# Patient Record
Sex: MALE | Race: WHITE | HISPANIC OR LATINO | Employment: UNEMPLOYED | URBAN - METROPOLITAN AREA
[De-identification: names, ages, dates, MRNs, and addresses within clinical notes are randomized per-mention and may not be internally consistent; named-entity substitution may affect disease eponyms.]

---
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
## 2025-03-20 ENCOUNTER — OFFICE VISIT (OUTPATIENT)
Dept: OBGYN CLINIC | Facility: MEDICAL CENTER | Age: 20
End: 2025-03-20
Payer: COMMERCIAL

## 2025-03-20 ENCOUNTER — APPOINTMENT (OUTPATIENT)
Dept: RADIOLOGY | Facility: MEDICAL CENTER | Age: 20
End: 2025-03-20
Payer: COMMERCIAL

## 2025-03-20 VITALS — WEIGHT: 151 LBS | HEIGHT: 68 IN | BODY MASS INDEX: 22.88 KG/M2

## 2025-03-20 DIAGNOSIS — M79.671 BILATERAL FOOT PAIN: Primary | ICD-10-CM

## 2025-03-20 DIAGNOSIS — M79.672 BILATERAL FOOT PAIN: Primary | ICD-10-CM

## 2025-03-20 DIAGNOSIS — M79.672 BILATERAL FOOT PAIN: ICD-10-CM

## 2025-03-20 DIAGNOSIS — M79.671 BILATERAL FOOT PAIN: ICD-10-CM

## 2025-03-20 PROCEDURE — 73630 X-RAY EXAM OF FOOT: CPT

## 2025-03-20 PROCEDURE — 99203 OFFICE O/P NEW LOW 30 MIN: CPT | Performed by: EMERGENCY MEDICINE

## 2025-03-20 NOTE — LETTER
March 20, 2025     Patient: Xiang Ram  YOB: 2005  Date of Visit: 3/20/2025      To Whom it May Concern:    Xiang Ram is under my professional care. Xiang was seen in my office on 3/20/2025.  Work excuse today, tomorrow and Saturday.  May return to work full duty Monday 3/24/25.      If you have any questions or concerns, please don't hesitate to call.         Sincerely,          Donny Peacock MD        CC: No Recipients

## 2025-04-28 ENCOUNTER — OFFICE VISIT (OUTPATIENT)
Dept: OBGYN CLINIC | Facility: OTHER | Age: 20
End: 2025-04-28
Payer: COMMERCIAL

## 2025-04-28 ENCOUNTER — APPOINTMENT (OUTPATIENT)
Dept: RADIOLOGY | Facility: OTHER | Age: 20
End: 2025-04-28
Attending: ORTHOPAEDIC SURGERY
Payer: COMMERCIAL

## 2025-04-28 VITALS — HEIGHT: 68 IN | WEIGHT: 155 LBS | BODY MASS INDEX: 23.49 KG/M2

## 2025-04-28 DIAGNOSIS — M25.512 ACUTE PAIN OF LEFT SHOULDER: ICD-10-CM

## 2025-04-28 DIAGNOSIS — S43.52XA ACROMIOCLAVICULAR SPRAIN, LEFT, INITIAL ENCOUNTER: Primary | ICD-10-CM

## 2025-04-28 PROCEDURE — 73030 X-RAY EXAM OF SHOULDER: CPT

## 2025-04-28 PROCEDURE — 99214 OFFICE O/P EST MOD 30 MIN: CPT | Performed by: ORTHOPAEDIC SURGERY

## 2025-04-28 RX ORDER — NAPROXEN 500 MG/1
500 TABLET ORAL 2 TIMES DAILY WITH MEALS
Qty: 20 TABLET | Refills: 0 | Status: SHIPPED | OUTPATIENT
Start: 2025-04-28

## 2025-04-28 NOTE — ASSESSMENT & PLAN NOTE
I explained my current clinical findings and reviewed the radiological findings with the patient.  Symptoms consistent with grade 1 left acromioclavicular joint sprain.  Prescribed oral naproxen for symptomatic relief and he may also do local ice application.  Encouraged to initiate left shoulder active range of motion exercises as tolerated.  Work activity modification note provided.  Follow-up in 3 weeks.  Patient was explained that symptomatic improvement can take 4 to 6 weeks.  Orders:    naproxen (NAPROSYN) 500 mg tablet; Take 1 tablet (500 mg total) by mouth 2 (two) times a day with meals

## 2025-04-28 NOTE — LETTER
April 28, 2025     Patient: Xiang Ram  YOB: 2005  Date of Visit: 4/28/2025      To Whom it May Concern:    Xiang Ram is under my professional care. Xiang was seen in my office on 4/28/2025.  Please excuse work duty from 4/28/2025 till 5/4/2025 due to an injury.  Thereafter, may return back to work with avoidance of repetitive left shoulder motion and no heavy lifting, pulling or pushing from the left upper extremity over 5 pounds until his next office follow-up in 3 weeks.    If you have any questions or concerns, please don't hesitate to call.          Sincerely,          Treva Smith MD        CC: No Recipients

## 2025-04-28 NOTE — PROGRESS NOTES
Name: Xiang Ram      : 2005      MRN: [id number]  Encounter Provider: Treva Smith MD  Encounter Date: 2025   Encounter department: [facility]  :  Assessment & Plan  Acromioclavicular sprain, left, initial encounter  I explained my current clinical findings and reviewed the radiological findings with the patient.  Symptoms consistent with grade 1 left acromioclavicular joint sprain.  Prescribed oral naproxen for symptomatic relief and he may also do local ice application.  Encouraged to initiate left shoulder active range of motion exercises as tolerated.  Work activity modification note provided.  Follow-up in 3 weeks.  Patient was explained that symptomatic improvement can take 4 to 6 weeks.  Orders:    naproxen (NAPROSYN) 500 mg tablet; Take 1 tablet (500 mg total) by mouth 2 (two) times a day with meals    Acute pain of left shoulder    Orders:    XR shoulder 2+ vw left; Future    naproxen (NAPROSYN) 500 mg tablet; Take 1 tablet (500 mg total) by mouth 2 (two) times a day with meals        History of Present Illness   HPI  Xiang Ram is a 20 y.o. male who presents for evaluation of left shoulder pain.  Reports accidentally falling onto his left shoulder 2 days ago after which she has developed pain in the superior aspect of the left shoulder with some radiation to the upper left posterior shoulder.  Symptoms aggravated with movement of the left shoulder.  Does not have any known history of previous left shoulder problems or injuries.  History obtained from: patient    Review of Systems       Objective   There were no vitals taken for this visit.     Physical Exam  Nursing note reviewed.              Left Shoulder Exam     Tenderness   The patient is experiencing tenderness in the acromioclavicular joint.    Range of Motion   Active abduction:  70   External rotation:  80   Forward flexion:  80   Internal rotation 0 degrees:  L1     Muscle Strength  "  The patient has normal left shoulder strength.    Tests   Apprehension: negative  Naranjo test: negative  Cross arm: positive  Impingement: negative  Drop arm: negative     Comments:  Overlying skin of the left shoulder appears normal with a minimal step-off noted compared to the contralateral side at the level of acromioclavicular joint.           I have personally reviewed pertinent films in PACS and my interpretation is plain radiograph of the left shoulder performed today does not reveal any acute fractures.  Minimal step-off of the acromioclavicular joint indicative of sprain..   Procedures  Portions of the record may have been created with voice recognition software. Occasional wrong word or \"sound alike\" substitutions may have occurred due to the inherent limitations of voice recognition software. Please review the chart carefully and recognize, using context, where substitutions/typographical errors may have occurred.      "

## 2025-05-19 ENCOUNTER — OFFICE VISIT (OUTPATIENT)
Dept: OBGYN CLINIC | Facility: OTHER | Age: 20
End: 2025-05-19
Payer: COMMERCIAL

## 2025-05-19 VITALS — WEIGHT: 150 LBS | BODY MASS INDEX: 22.73 KG/M2 | HEIGHT: 68 IN

## 2025-05-19 DIAGNOSIS — S43.52XA ACROMIOCLAVICULAR SPRAIN, LEFT, INITIAL ENCOUNTER: Primary | ICD-10-CM

## 2025-05-19 PROCEDURE — 99213 OFFICE O/P EST LOW 20 MIN: CPT | Performed by: ORTHOPAEDIC SURGERY

## 2025-05-19 NOTE — ASSESSMENT & PLAN NOTE
The patient has an examination consistent with well healed left AC joint sprain.  I have discussed with the patient the pathophysiology of this diagnosis and reviewed how the examination correlates with this diagnosis.  At this time, patient may be cleared to return to work without restrictions. He may follow up on an as needed basis.

## 2025-05-19 NOTE — LETTER
May 19, 2025     Patient: Xiang Ram  YOB: 2005  Date of Visit: 5/19/2025      To Whom it May Concern:    Xiang Ram is under my professional care. Xiang was seen in my office on 5/19/2025. He may return to work without restrictions starting 5/19/25.     If you have any questions or concerns, please don't hesitate to call.          Sincerely,          Pavel Allred MD        CC: No Recipients

## 2025-05-19 NOTE — PROGRESS NOTES
"I personally examined the patient and reviewed the history provided.  I agree with the note and the assessment and plan by Dr. Chadwick Welsh MD.     Assessment & Plan  Acromioclavicular sprain, left, initial encounter       The patient has an examination consistent with well healed left AC joint sprain.  I have discussed with the patient the pathophysiology of this diagnosis and reviewed how the examination correlates with this diagnosis.  At this time, patient may be cleared to return to work without restrictions. He may follow up on an as needed basis.    Subjective:   Patient ID: Xiang Ram is a 20 y.o. male      HPI  The patient presents for follow up of left AC joint sprain after fall onto left shoulder. He was seen by Dr. Smith and prescribed oral naproxen for relief. He was encouraged to initiate left shoulder active range of motion exercises as tolerated.  He is provided a work activity modification note and now follows up 3 weeks later.  Over the past 3 weeks, patient has been compliant with prescribed modifications and range of motion exercises.  He reports that his pain is now subsided and he has full range of motion of his shoulder.  He presents here today for reevaluation and clearance for return to work without restrictions      The following portions of the patient's history were reviewed and updated as appropriate: allergies, current medications, past family history, past medical history, past social history, past surgical history and problem list.      Objective:  Ht 5' 8\" (1.727 m) Comment: verbal  Wt 68 kg (150 lb)   BMI 22.81 kg/m²       Left Shoulder Exam   Left shoulder exam is normal.    Range of Motion   The patient has normal left shoulder ROM.    Muscle Strength   The patient has normal left shoulder strength.  Abduction: 5/5   Internal rotation: 5/5   External rotation: 5/5   Supraspinatus: 5/5   Subscapularis: 5/5   Biceps: 5/5     Other   Sensation: normal  Pulse: present "           I have personally reviewed pertinent films in PACS and my interpretation is as follows.    Xrays left shoulder 4/28/25 show no fracture, well appearing GH joint     Records Reviewed: office notes and x-ray reports

## 2025-06-23 ENCOUNTER — OFFICE VISIT (OUTPATIENT)
Dept: OBGYN CLINIC | Facility: OTHER | Age: 20
End: 2025-06-23
Payer: COMMERCIAL

## 2025-06-23 VITALS — HEIGHT: 68 IN | WEIGHT: 151 LBS | BODY MASS INDEX: 22.88 KG/M2

## 2025-06-23 DIAGNOSIS — S43.52XD ACROMIOCLAVICULAR SPRAIN, LEFT, SUBSEQUENT ENCOUNTER: Primary | ICD-10-CM

## 2025-06-23 PROCEDURE — 99213 OFFICE O/P EST LOW 20 MIN: CPT | Performed by: ORTHOPAEDIC SURGERY

## 2025-06-23 NOTE — ASSESSMENT & PLAN NOTE
"Discussed with the patient and his family member that he continues to have symptoms consistent with an AC joint sprain (Grade 1).  He does note that his symptoms resolved but then have recurred within a week and therefore more treatment is indicated.  Typically this is self limiting and will improve with time but given his persistence of symptoms physical therapy will be initiated to provide local care and if he fails to improve further imaging the form of an MRI could be considered.  I do not think an MRI arthrogram is required for the diagnosis given his symptoms localized to the AC joint and his lack of any other internal derangement signs or symptoms to suggest a labral tear, diagnosis of a labral tear would would require MRI arthrogram but given his lack of these findings and a low clinical suspicion of a labral tear I would avoid the intervention of the arthrogram..    Surgical treatment is reserved for patients who cannot improve with time activity modification and that treatment would be a distal clavicle excision, I would hesitate to perform this in a 20-year-old without significant amount of nonoperative care first but this is always a possibility in the future.  The patient does complain that there is a \"bump \"over his AC joint, this is certainly observable and is quite subtle, I do not feel surgical intervention to improve the cosmetic appearance of the AC joint is indicated in this scenario  Follow up in 6 weeks       Orders:    Ambulatory Referral to Physical Therapy; Future    "

## 2025-06-23 NOTE — PROGRESS NOTES
"  Assessment & Plan  Acromioclavicular sprain, left, subsequent encounter  Discussed with the patient and his family member that he continues to have symptoms consistent with an AC joint sprain (Grade 1).  He does note that his symptoms resolved but then have recurred within a week and therefore more treatment is indicated.  Typically this is self limiting and will improve with time but given his persistence of symptoms physical therapy will be initiated to provide local care and if he fails to improve further imaging the form of an MRI could be considered.  I do not think an MRI arthrogram is required for the diagnosis given his symptoms localized to the AC joint and his lack of any other internal derangement signs or symptoms to suggest a labral tear, diagnosis of a labral tear would would require MRI arthrogram but given his lack of these findings and a low clinical suspicion of a labral tear I would avoid the intervention of the arthrogram..    Surgical treatment is reserved for patients who cannot improve with time activity modification and that treatment would be a distal clavicle excision, I would hesitate to perform this in a 20-year-old without significant amount of nonoperative care first but this is always a possibility in the future.  The patient does complain that there is a \"bump \"over his AC joint, this is certainly observable and is quite subtle, I do not feel surgical intervention to improve the cosmetic appearance of the AC joint is indicated in this scenario  Follow up in 6 weeks       Orders:    Ambulatory Referral to Physical Therapy; Future      Subjective:   Patient ID: Xiang Ram is a 20 y.o. male      HPI  Patient presents today for follow up regarding left shoulder AC sprain.  DOI 4/26/25. He was last seen 5/19/25 at which time he was doing well.  He was cleared to return to work at that visit without restrictions.  Today he reports he has a noticeable lump at the AC joint.  He reports " "increased pain after lifting heavier weight.        The following portions of the patient's history were reviewed and updated as appropriate: allergies, current medications, past family history, past medical history, past social history, past surgical history and problem list.        Objective:  Ht 5' 8\" (1.727 m) Comment: verbal  Wt 68.5 kg (151 lb)   BMI 22.96 kg/m²       Left Shoulder Exam     Tenderness   The patient is experiencing tenderness in the acromioclavicular joint.    Range of Motion   The patient has normal left shoulder ROM.    Muscle Strength   Abduction: 5/5   Internal rotation: 5/5     Other   Erythema: absent  Sensation: normal  Pulse: present             Physical Exam  Vitals reviewed.   Constitutional:       Appearance: He is well-developed.   HENT:      Head: Normocephalic.     Eyes:      Pupils: Pupils are equal, round, and reactive to light.     Pulmonary:      Effort: Pulmonary effort is normal.   Abdominal:      General: Abdomen is flat. There is no distension.     Skin:     General: Skin is warm and dry.           I have personally reviewed pertinent films in PACS and my interpretation is as follows.    Xrays left shoulder 4/28/25 show no fracture, well appearing GH joint       Records Reviewed: prior office notes     Scribe Attestation      I,:  Racheal Muse MA am acting as a scribe while in the presence of the attending physician.:       I,:  Pavel Allred MD personally performed the services described in this documentation    as scribed in my presence.:            "